# Patient Record
Sex: FEMALE | Race: BLACK OR AFRICAN AMERICAN | NOT HISPANIC OR LATINO | URBAN - METROPOLITAN AREA
[De-identification: names, ages, dates, MRNs, and addresses within clinical notes are randomized per-mention and may not be internally consistent; named-entity substitution may affect disease eponyms.]

---

## 2020-03-31 ENCOUNTER — EMERGENCY (EMERGENCY)
Facility: HOSPITAL | Age: 35
LOS: 1 days | Discharge: HOME | End: 2020-03-31
Admitting: EMERGENCY MEDICINE
Payer: SELF-PAY

## 2020-03-31 VITALS
TEMPERATURE: 99 F | DIASTOLIC BLOOD PRESSURE: 82 MMHG | RESPIRATION RATE: 16 BRPM | HEART RATE: 65 BPM | SYSTOLIC BLOOD PRESSURE: 120 MMHG | HEIGHT: 63 IN | WEIGHT: 160.06 LBS | OXYGEN SATURATION: 99 %

## 2020-03-31 PROCEDURE — 93010 ELECTROCARDIOGRAM REPORT: CPT

## 2020-03-31 PROCEDURE — 99283 EMERGENCY DEPT VISIT LOW MDM: CPT | Mod: 25

## 2020-03-31 NOTE — ED ADULT TRIAGE NOTE - BMI (KG/M2)
[facility]ITAL[facility]  Progress Note     Neymarpablo Herreraronda Patient Status:  Inpatient    1955 MRN [id number]   [facility] 7NE-A Attending Cyntha Leventhal, MD   Hosp Day # 3 PCP Lily Spring MD     Chief Complaint: confusion    S: Patient more a Once in dialysis   • epoetin sreekanth  10,000 Units Intravenous Once in dialysis   • Normal Saline Flush  10 mL Intravenous Q12H   • Pantoprazole Sodium  40 mg Oral QAM AC   • sodium chloride   Intravenous Once   • Atorvastatin Calcium  20 mg Oral Nightly   • 28.4

## 2020-04-01 VITALS
OXYGEN SATURATION: 98 % | RESPIRATION RATE: 16 BRPM | DIASTOLIC BLOOD PRESSURE: 72 MMHG | SYSTOLIC BLOOD PRESSURE: 115 MMHG | HEART RATE: 65 BPM | TEMPERATURE: 99 F

## 2020-04-01 NOTE — ED PROVIDER NOTE - CLINICAL SUMMARY MEDICAL DECISION MAKING FREE TEXT BOX
34 y female with no PMH presents with c/o intermittent SOB for the last few months.  Episodes are self limited and often but not always accompanied by palpitations.  She had an appt to see PMD but it was recently cancelled due to office hours change 2/2 Corona Virus Pandemic.  Tonight her symptoms were worse than usual and lasted longer so her  brought her to ER.  Symptoms completely resolved while in waiting area.   EKG obtained. Sinus Dillon no acute changes  Pt is stable and  will be d/erika home with advice to follow up with her PMD for further outpatient work up

## 2020-04-01 NOTE — ED PROVIDER NOTE - OBJECTIVE STATEMENT
34 y female with no PMH presents with c/o intermittent SOB for the last few months.  Episodes are self limited and often but not always accompanied by palpitations.  She had an appt to see PMD but it was recently cancelled due to office hours change 2/2 Corona Virus Pandemic.  Tonight her symptoms were worse than usual and lasted longer so her  brought her to ER.  In waiting area her S/Sx completely resolved.  She is asymptomatic now.  She denies any SOB,CP, Palps at present  She also denies any recent history of Cough, fever, or body aches.

## 2020-04-01 NOTE — ED PROVIDER NOTE - PATIENT PORTAL LINK FT
You can access the FollowMyHealth Patient Portal offered by [facility] by registering at the following website: [email or web address]. By joining [employer]’s FollowMyHealth portal, you will also be able to view your health information using other applications (apps) compatible with our system.
